# Patient Record
Sex: MALE
[De-identification: names, ages, dates, MRNs, and addresses within clinical notes are randomized per-mention and may not be internally consistent; named-entity substitution may affect disease eponyms.]

---
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
## 2023-05-22 ENCOUNTER — HOSPITAL ENCOUNTER (EMERGENCY)
Dept: HOSPITAL 97 - ER | Age: 18
Discharge: HOME | End: 2023-05-22
Payer: COMMERCIAL

## 2023-05-22 VITALS — DIASTOLIC BLOOD PRESSURE: 69 MMHG | OXYGEN SATURATION: 97 % | SYSTOLIC BLOOD PRESSURE: 117 MMHG

## 2023-05-22 VITALS — TEMPERATURE: 98.2 F

## 2023-05-22 DIAGNOSIS — S00.81XA: Primary | ICD-10-CM

## 2023-05-22 PROCEDURE — 70450 CT HEAD/BRAIN W/O DYE: CPT

## 2023-05-22 NOTE — RAD REPORT
EXAM DESCRIPTION:  CT - Head Brain Wo Cont - 5/22/2023 1:13 am

 

CLINICAL HISTORY:  CONFUSED

Trauma, head injury

 

COMPARISON:  <Comparisons>

 

TECHNIQUE:  All CT scans are performed using dose optimization technique as appropriate and may inclu
de automated exposure control or mA/KV adjustment according to patient size.

 

FINDINGS:  No intracranial hemorrhage, hydrocephalus or extra-axial fluid collection.No areas of brai
n edema or evidence of midline shift.

 

The paranasal sinuses and mastoids are clear. The calvarium is intact.

 

IMPRESSION:  No acute intracranial abnormality.

## 2023-05-22 NOTE — EDPHYS
Physician Documentation                                                                           

 [facility]                                                                 

Name: Yang Ortega                                                                                   

Age: 17 yrs                                                                                       

Sex: Male                                                                                         

: 2005                                                                                   

MRN: [id number]                                                                                   

Arrival Date: 2023                                                                          

Time: 00:07                                                                                       

Account#: [id number]                                                                            

Bed 17                                                                                            

Private MD:                                                                                       

ED Physician Dylan Garsia                                                                       

HPI:                                                                                              

                                                                                             

00:28 This 17 yrs old  Male presents to ER via [facility] with complaints of Head     bs3 

      Injury With LOC-Adult.                                                                      

00:28 17-year-old male presents for evaluation of his head after hitting it against the       bs3 

      steering wheel he is not sure if he lost consciousness he notes that he got frustrated      

      and angry and then did this. He denies wanting to hurt himself or anyone else he notes      

      that he got frustrated over an argument.                                                    

                                                                                                  

Historical:                                                                                       

- Allergies:                                                                                      

00:43 No Known Allergies;                                                                     ha1 

- Home Meds:                                                                                      

00:43 None [Active];                                                                          ha1 

                                                                                                  

- Immunization history:: Adult Immunizations up to date.                                          

- Social history:: Smoking status: unknown.                                                       

- Immunization history: Last tetanus immunization: unknown.                                       

                                                                                                  

                                                                                                  

ROS:                                                                                              

00:28 Constitutional: Negative for fever, chills                                              bs3 

00:28 All other systems are negative.                                                             

                                                                                                  

Exam:                                                                                             

00:28 Constitutional:  This is a well developed, well nourished patient who is awake, alert,  bs3 

      and in no acute distress. Head/Face:  forehead abrasion                                     

01:27 Eyes:  Pupils equal round and reactive to light, extra-ocular motions intact.  Lids and bs3 

      lashes normal.   ENT:  mmm, no posterior phyarngeal erythema Neck:  Trachea midline, no     

      thyromegaly, no neck stiffness Chest/axilla:  Normal chest wall appearance and motion.      

      Nontender with no deformity.  No lesions are appreciated. Cardiovascular:  Regular rate     

      and rhythm with a normal S1 and S2.  symmetric pulses in upper extremities Respiratory:     

       Lungs have equal breath sounds bilaterally, clear to auscultation, no respiratory          

      distress MS/ Extremity:  Pulses equal, no cyanosis.  Neurovascular intact.  Full,           

      normal range of motion. Neuro:  Awake and alert, GCS 15, oriented to person, place,         

      time, and situation.  Cranial nerves II-XII grossly intact.  Motor strength 5/5 in all      

      extremities.  Sensory grossly intact.   Psych:  Awake, alert, with orientation to           

      person, place and time.  Behavior, mood, and affect are within normal limits.               

                                                                                                  

Vital Signs:                                                                                      

00:15  / 73; Pulse 80; Resp 16 S; Temp 98.2(O); Pulse Ox 100% on R/A; Weight 77.11 kg;  ha1 

      Height 6 ft. 3 in. ; Pain 6/10;                                                             

02:23  / 66; Pulse 61; Resp 18 S; Pulse Ox 98% on R/A;                                  ha1 

03:15  / 69; Pulse 69; Resp 18 S; Pulse Ox 97% on R/A;                                  ha1 

00:15 Body Mass Index 21.25 (77.11 kg, 190.5 cm)                                              ha1 

00:15 Pain Scale: Adult                                                                       ha1 

                                                                                                  

Mc Coma Score:                                                                               

00:15 Eye Response: spontaneous(4). Motor Response: obeys commands(6). Verbal Response:       ha1 

      oriented(5). Total: 15.                                                                     

                                                                                                  

Trauma Score (Adult):                                                                             

00:15 Eye Response: spontaneous(1); Verbal Response: oriented(1); Motor Response: obeys       ha1 

      commands(2); Systolic BP: > 89 mm Hg(4); Respiratory Rate: 10 to 29 per min(4); Mc     

      Score: 15; Trauma Score: 12                                                                 

                                                                                                  

MDM:                                                                                              

00:15 Patient medically screened.                                                             bs3 

01:27 Data reviewed: vital signs, nurses notes. ED course: Will evaluate for intracranial     bs3 

      hemorrhage assess patient for thoughts of wanting to hurt himself or anyone else and he     

      denied.                                                                                     

03:20 ED course: CT negative for acute pathology will discharge home.                         bs3 

                                                                                                  

                                                                                             

00:21 Order name: CT Head Brain wo Cont; Complete Time: 02:12                                 bs3 

                                                                                                  

Administered Medications:                                                                         

No medications were administered                                                                  

                                                                                                  

                                                                                                  

Disposition Summary:                                                                              

23 03:20                                                                                    

Discharge Ordered                                                                                 

      Location: Home                                                                          bs3 

      Problem: new                                                                            bs3 

      Symptoms: have improved                                                                 bs3 

      Condition: Stable                                                                       bs3 

      Diagnosis                                                                                   

        - Unspecified injury of head, initial encounter                                       bs3 

      Followup:                                                                               bs3 

        - With: Private Physician                                                                  

        - When: 2 - 3 days                                                                         

        - Reason: Re-evaluation by your physician                                                  

      Discharge Instructions:                                                                     

        - Discharge Summary Sheet                                                             bs3 

        - Head Injury, Adult                                                                  bs3 

      Forms:                                                                                      

        - Medication Reconciliation Form                                                      bs3 

        - Thank You Letter                                                                    bs3 

        - Antibiotic Education                                                                bs3 

        - Prescription Opioid Use                                                             bs3 

Signatures:                                                                                       

Dispatcher [employer]                           EDMargarette Pascual RN                        RN   ha1                                                  

Dylan Garsia MD MD   bs3                                                  

                                                                                                  

Corrections: (The following items were deleted from the chart)                                    

01:28 00:28 17-year-old male presents for evaluation of his head after hitting it against the bs3 

      steering wheel he is not sure if he lost consciousness he notes that he got frustrated      

      and angry and therefore headache. bs3                                                       

                                                                                                  

**************************************************************************************************
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
